# Patient Record
Sex: FEMALE | Race: WHITE | URBAN - METROPOLITAN AREA
[De-identification: names, ages, dates, MRNs, and addresses within clinical notes are randomized per-mention and may not be internally consistent; named-entity substitution may affect disease eponyms.]

---

## 2018-06-28 ENCOUNTER — APPOINTMENT (OUTPATIENT)
Dept: URBAN - METROPOLITAN AREA CLINIC 292 | Age: 48
Setting detail: DERMATOLOGY
End: 2018-06-28

## 2018-06-28 DIAGNOSIS — L82.0 INFLAMED SEBORRHEIC KERATOSIS: ICD-10-CM

## 2018-06-28 DIAGNOSIS — L82.1 OTHER SEBORRHEIC KERATOSIS: ICD-10-CM

## 2018-06-28 DIAGNOSIS — L81.0 POSTINFLAMMATORY HYPERPIGMENTATION: ICD-10-CM

## 2018-06-28 PROCEDURE — 17110 DESTRUCT B9 LESION 1-14: CPT

## 2018-06-28 PROCEDURE — OTHER COUNSELING: OTHER

## 2018-06-28 PROCEDURE — OTHER LIQUID NITROGEN: OTHER

## 2018-06-28 PROCEDURE — 99203 OFFICE O/P NEW LOW 30 MIN: CPT | Mod: 25

## 2018-06-28 ASSESSMENT — LOCATION DETAILED DESCRIPTION DERM: LOCATION DETAILED: INFERIOR THORACIC SPINE

## 2018-06-28 ASSESSMENT — LOCATION ZONE DERM: LOCATION ZONE: TRUNK

## 2018-06-28 ASSESSMENT — LOCATION SIMPLE DESCRIPTION DERM: LOCATION SIMPLE: UPPER BACK

## 2018-06-28 NOTE — PROCEDURE: LIQUID NITROGEN
Post-Care Instructions: 1.  After treatment with liquid nitrogen there may be some burning sensation or pain that can last up to 24 hours.  The discomfort can be relieved with extra strength Tylenol or similar pain medications taken as directed.  \\n2.  Within 24 to 48 hours a blister may form containing clear or bloody fluid.  This is expected.  If the blister becomes larger than a nickel you may arceo it with a sterile needle.\\n3.  Wast the area with soap and water twice daily.\\n4.  Apply a generous amount of Vaseline or Aquaphor ointment to the treated sites.\\n5.  Place a small dressing or Band-Aid over the wound.\\n\\nIf any problems occur, please contact our office at [phone] or go to the nearest Emergency Center. Post-Care Instructions: 1.  After treatment with liquid nitrogen there may be some burning sensation or pain that can last up to 24 hours.  The discomfort can be relieved with extra strength Tylenol or similar pain medications taken as directed.  \\n2.  Within 24 to 48 hours a blister may form containing clear or bloody fluid.  This is expected.  If the blister becomes larger than a nickel you may arceo it with a sterile needle.\\n3.  Wast the area with soap and water twice daily.\\n4.  Apply a generous amount of Vaseline or Aquaphor ointment to the treated sites.\\n5.  Place a small dressing or Band-Aid over the wound.\\n\\nIf any problems occur, please contact our office at [phone] or go to the nearest Emergency Center.

## 2019-11-18 ENCOUNTER — APPOINTMENT (OUTPATIENT)
Dept: URBAN - METROPOLITAN AREA CLINIC 292 | Age: 49
Setting detail: DERMATOLOGY
End: 2019-11-18

## 2019-11-18 DIAGNOSIS — L82.0 INFLAMED SEBORRHEIC KERATOSIS: ICD-10-CM

## 2019-11-18 DIAGNOSIS — L82.1 OTHER SEBORRHEIC KERATOSIS: ICD-10-CM

## 2019-11-18 DIAGNOSIS — L81.0 POSTINFLAMMATORY HYPERPIGMENTATION: ICD-10-CM

## 2019-11-18 PROCEDURE — 17110 DESTRUCT B9 LESION 1-14: CPT

## 2019-11-18 PROCEDURE — 99213 OFFICE O/P EST LOW 20 MIN: CPT | Mod: 25

## 2019-11-18 PROCEDURE — OTHER LIQUID NITROGEN: OTHER

## 2019-11-18 PROCEDURE — OTHER COUNSELING: OTHER

## 2019-11-18 ASSESSMENT — LOCATION DETAILED DESCRIPTION DERM: LOCATION DETAILED: LEFT INFERIOR LATERAL FOREHEAD

## 2019-11-18 ASSESSMENT — LOCATION SIMPLE DESCRIPTION DERM: LOCATION SIMPLE: LEFT FOREHEAD

## 2019-11-18 ASSESSMENT — LOCATION ZONE DERM: LOCATION ZONE: FACE

## 2019-11-18 NOTE — PROCEDURE: LIQUID NITROGEN
Post-Care Instructions: 1.  After treatment with liquid nitrogen there may be some burning sensation or pain that can last up to 24 hours.  The discomfort can be relieved with extra strength Tylenol or similar pain medications taken as directed.  \\n2.  Within 24 to 48 hours a blister may form containing clear or bloody fluid.  This is expected.  If the blister becomes larger than a nickel you may arceo it with a sterile needle.\\n3.  Wast the area with soap and water twice daily.\\n4.  Apply a generous amount of Vaseline or Aquaphor ointment to the treated sites.\\n5.  Place a small dressing or Band-Aid over the wound.\\n\\nIf any problems occur, please contact our office at [phone] or go to the nearest Emergency Center. Post-Care Instructions: 1.  After treatment with liquid nitrogen there may be some burning sensation or pain that can last up to 24 hours.  The discomfort can be relieved with extra strength Tylenol or similar pain medications taken as directed.  \\n2.  Within 24 to 48 hours a blister may form containing clear or bloody fluid.  This is expected.  If the blister becomes larger than a nickel you may arceo it with a sterile needle.\\n3.  Wast the area with soap and water twice daily.\\n4.  Apply a generous amount of Vaseline or Aquaphor ointment to the treated sites.\\n5.  Place a small dressing or Band-Aid over the wound.\\n\\nIf any problems occur, please contact our office at [phone] or go to the nearest Emergency Center.

## 2021-03-31 ENCOUNTER — APPOINTMENT (OUTPATIENT)
Dept: URBAN - METROPOLITAN AREA CLINIC 289 | Age: 51
Setting detail: DERMATOLOGY
End: 2021-04-03

## 2021-03-31 DIAGNOSIS — L82.1 OTHER SEBORRHEIC KERATOSIS: ICD-10-CM

## 2021-03-31 DIAGNOSIS — L85.3 XEROSIS CUTIS: ICD-10-CM

## 2021-03-31 DIAGNOSIS — L82.0 INFLAMED SEBORRHEIC KERATOSIS: ICD-10-CM

## 2021-03-31 DIAGNOSIS — L81.4 OTHER MELANIN HYPERPIGMENTATION: ICD-10-CM

## 2021-03-31 PROCEDURE — OTHER COUNSELING: OTHER

## 2021-03-31 PROCEDURE — OTHER LIQUID NITROGEN: OTHER

## 2021-03-31 PROCEDURE — 17110 DESTRUCT B9 LESION 1-14: CPT

## 2021-03-31 PROCEDURE — 99213 OFFICE O/P EST LOW 20 MIN: CPT | Mod: 25

## 2021-03-31 ASSESSMENT — LOCATION SIMPLE DESCRIPTION DERM
LOCATION SIMPLE: RIGHT THIGH
LOCATION SIMPLE: RIGHT THIGH

## 2021-03-31 ASSESSMENT — LOCATION DETAILED DESCRIPTION DERM
LOCATION DETAILED: RIGHT ANTERIOR PROXIMAL THIGH
LOCATION DETAILED: RIGHT ANTERIOR PROXIMAL THIGH

## 2021-03-31 ASSESSMENT — LOCATION ZONE DERM
LOCATION ZONE: LEG
LOCATION ZONE: LEG

## 2022-05-17 ENCOUNTER — APPOINTMENT (OUTPATIENT)
Dept: URBAN - METROPOLITAN AREA CLINIC 289 | Age: 52
Setting detail: DERMATOLOGY
End: 2022-05-20

## 2022-05-17 DIAGNOSIS — L81.4 OTHER MELANIN HYPERPIGMENTATION: ICD-10-CM

## 2022-05-17 DIAGNOSIS — L82.1 OTHER SEBORRHEIC KERATOSIS: ICD-10-CM

## 2022-05-17 PROCEDURE — OTHER COUNSELING: OTHER

## 2022-05-17 PROCEDURE — 99213 OFFICE O/P EST LOW 20 MIN: CPT

## 2022-07-11 ENCOUNTER — APPOINTMENT (OUTPATIENT)
Dept: URBAN - METROPOLITAN AREA CLINIC 289 | Age: 52
Setting detail: DERMATOLOGY
End: 2022-07-11

## 2022-07-11 DIAGNOSIS — L82.1 OTHER SEBORRHEIC KERATOSIS: ICD-10-CM

## 2022-07-11 DIAGNOSIS — L81.4 OTHER MELANIN HYPERPIGMENTATION: ICD-10-CM

## 2022-07-11 DIAGNOSIS — L82.0 INFLAMED SEBORRHEIC KERATOSIS: ICD-10-CM

## 2022-07-11 PROCEDURE — OTHER LIQUID NITROGEN: OTHER

## 2022-07-11 PROCEDURE — 17110 DESTRUCT B9 LESION 1-14: CPT

## 2022-07-11 PROCEDURE — 99213 OFFICE O/P EST LOW 20 MIN: CPT | Mod: 25

## 2022-07-11 PROCEDURE — OTHER COUNSELING: OTHER

## 2022-07-11 ASSESSMENT — LOCATION ZONE DERM: LOCATION ZONE: LEG

## 2022-07-11 ASSESSMENT — LOCATION SIMPLE DESCRIPTION DERM: LOCATION SIMPLE: RIGHT THIGH

## 2022-07-11 ASSESSMENT — LOCATION DETAILED DESCRIPTION DERM: LOCATION DETAILED: RIGHT ANTERIOR PROXIMAL THIGH

## 2022-07-11 NOTE — PROCEDURE: LIQUID NITROGEN
Show Spray Paint Technique Variable?: Yes
Spray Paint Text: The liquid nitrogen was applied to the skin utilizing a spray paint frosting technique.
Render Note In Bullet Format When Appropriate: No
Detail Level: Detailed
Post-Care Instructions: I reviewed with the patient in detail post-care instructions. Patient is to wear sunprotection, and avoid picking at any of the treated lesions. Pt may apply Vaseline to crusted or scabbing areas.
Medical Necessity Information: It is in your best interest to select a reason for this procedure from the list below. All of these items fulfill various CMS LCD requirements except the new and changing color options.
Medical Necessity Clause: This procedure was medically necessary because the lesions that were treated were:
Consent: The patient's consent was obtained including but not limited to risks of crusting, scabbing, blistering, scarring, darker or lighter pigmentary change, recurrence, incomplete removal and infection.

## 2023-05-11 ENCOUNTER — APPOINTMENT (OUTPATIENT)
Dept: URBAN - METROPOLITAN AREA CLINIC 233 | Age: 53
Setting detail: DERMATOLOGY
End: 2023-05-15

## 2023-05-11 DIAGNOSIS — L82.1 OTHER SEBORRHEIC KERATOSIS: ICD-10-CM

## 2023-05-11 DIAGNOSIS — L81.4 OTHER MELANIN HYPERPIGMENTATION: ICD-10-CM

## 2023-05-11 DIAGNOSIS — L29.8 OTHER PRURITUS: ICD-10-CM

## 2023-05-11 PROCEDURE — OTHER ORDER TESTS: OTHER

## 2023-05-11 PROCEDURE — OTHER PRESCRIPTION: OTHER

## 2023-05-11 PROCEDURE — OTHER MIPS QUALITY: OTHER

## 2023-05-11 PROCEDURE — OTHER COUNSELING: OTHER

## 2023-05-11 PROCEDURE — 99213 OFFICE O/P EST LOW 20 MIN: CPT

## 2023-05-11 RX ORDER — CAMPHOR AND MENTHOL 5; 5 MG/ML; MG/ML
LOTION TOPICAL
Qty: 222 | Refills: 3 | Status: ERX | COMMUNITY
Start: 2023-05-11

## 2023-05-11 RX ORDER — LORATADINE 10 MG/1
TABLET ORAL
Qty: 60 | Refills: 3 | Status: ERX | COMMUNITY
Start: 2023-05-11

## 2023-05-11 ASSESSMENT — LOCATION ZONE DERM: LOCATION ZONE: FACE

## 2023-05-11 ASSESSMENT — LOCATION DETAILED DESCRIPTION DERM: LOCATION DETAILED: LEFT SUPERIOR MEDIAL BUCCAL CHEEK

## 2023-05-11 ASSESSMENT — LOCATION SIMPLE DESCRIPTION DERM: LOCATION SIMPLE: LEFT CHEEK

## 2023-05-11 NOTE — PROCEDURE: ORDER TESTS
Expected Date Of Service: 05/11/2023
Performing Laboratory: [id number]
Bill For Surgical Tray: no
Billing Type: Third-Party Bill

## 2023-06-30 ENCOUNTER — APPOINTMENT (OUTPATIENT)
Dept: URBAN - METROPOLITAN AREA CLINIC 233 | Age: 53
Setting detail: DERMATOLOGY
End: 2023-06-30

## 2023-06-30 DIAGNOSIS — L81.4 OTHER MELANIN HYPERPIGMENTATION: ICD-10-CM

## 2023-06-30 DIAGNOSIS — L82.1 OTHER SEBORRHEIC KERATOSIS: ICD-10-CM

## 2023-06-30 DIAGNOSIS — L29.8 OTHER PRURITUS: ICD-10-CM

## 2023-06-30 PROCEDURE — 99213 OFFICE O/P EST LOW 20 MIN: CPT

## 2023-06-30 PROCEDURE — OTHER MIPS QUALITY: OTHER

## 2023-06-30 PROCEDURE — OTHER PRESCRIPTION: OTHER

## 2023-06-30 PROCEDURE — OTHER COUNSELING: OTHER

## 2023-06-30 PROCEDURE — OTHER TREATMENT REGIMEN: OTHER

## 2023-06-30 RX ORDER — LORATADINE 10 MG/1
TABLET ORAL
Qty: 60 | Refills: 3 | Status: ERX

## 2023-06-30 RX ORDER — LEVOCETIRIZINE DIHYDROCHLORIDE 5 MG/1
TABLET, COATED ORAL
Qty: 60 | Refills: 3 | Status: ERX | COMMUNITY
Start: 2023-06-30

## 2023-06-30 ASSESSMENT — LOCATION DETAILED DESCRIPTION DERM: LOCATION DETAILED: LEFT SUPERIOR MEDIAL BUCCAL CHEEK

## 2023-06-30 ASSESSMENT — LOCATION ZONE DERM: LOCATION ZONE: FACE

## 2023-06-30 ASSESSMENT — LOCATION SIMPLE DESCRIPTION DERM: LOCATION SIMPLE: LEFT CHEEK

## 2024-03-20 NOTE — PROCEDURE: COUNSELING
Problem: At Risk for Falls  Goal: Patient does not fall  3/19/2024 2152 by Alexandra Lyman RN  Outcome: Monitoring/Evaluating progress  3/19/2024 2152 by Alexandra Lyman RN  Outcome: Monitoring/Evaluating progress  Goal: Patient takes action to control fall-related risks  3/19/2024 2152 by Alexandra Lyman RN  Outcome: Monitoring/Evaluating progress  3/19/2024 2152 by Alexandra Lyman RN  Outcome: Monitoring/Evaluating progress     Problem: Pain  Goal: Acceptable pain level achieved/maintained at rest using appropriate pain scale for the patient  3/19/2024 2152 by Alexandra Lyman RN  Outcome: Monitoring/Evaluating progress  3/19/2024 2152 by Alexandra Lyman RN  Outcome: Monitoring/Evaluating progress  Goal: Acceptable pain level achieved/maintained with activity using appropriate pain scale for the patient  3/19/2024 2152 by Alexandra Lyman RN  Outcome: Monitoring/Evaluating progress  3/19/2024 2152 by Alexandra Lyman RN  Outcome: Monitoring/Evaluating progress  Goal: Acceptable pain level achieved/maintained without oversedation  3/19/2024 2152 by Alexandra Lyman RN  Outcome: Monitoring/Evaluating progress  3/19/2024 2152 by Alexandra Lyman RN  Outcome: Monitoring/Evaluating progress     Problem: Fluid Volume Excess  Goal: Fluid Volume Excess Symptoms Resolved  Description: Treatment often consists of oxygen and respiratory support with diuretic therapy at doses that exceed usual dose (typically doubled).  Monitor patient response to treatment.    Acute dyspnea should resolve quickly if dose is adequate and kidney function is adequate. Dyspnea/SOB should only be observed with Activity after effective treatment. Patient should be able to lie down comfortably, without SOB.  3/19/2024 2152 by Alexandra Lyman RN  Outcome: Monitoring/Evaluating progress  3/19/2024 2152 by Alexandra Lyman RN  Outcome: Monitoring/Evaluating progress  Goal: Oxygenation is maintained (SpO2 greater than or equal to 
90% or as ordered)  3/19/2024 2152 by Alexandra Lyman RN  Outcome: Monitoring/Evaluating progress  3/19/2024 2152 by Alexandra Lyman RN  Outcome: Monitoring/Evaluating progress     Problem: Skin Integrity Alteration  Goal: Skin remains intact with no new/deterioration of wound or pressure injury  3/19/2024 2152 by Alexandra Lyman RN  Outcome: Monitoring/Evaluating progress  3/19/2024 2152 by Alexandra Lyman RN  Outcome: Monitoring/Evaluating progress  Goal: Participates in wound care activities  3/19/2024 2152 by Alexandra Lyman RN  Outcome: Monitoring/Evaluating progress  3/19/2024 2152 by Alexandra Lyman RN  Outcome: Monitoring/Evaluating progress  Goal: Comfort optimized with pressure injury prevention strategies guided by patient/family preference. (Hospice)  3/19/2024 2152 by Alexandra Lyman RN  Outcome: Monitoring/Evaluating progress  3/19/2024 2152 by Alexandra Lyman RN  Outcome: Monitoring/Evaluating progress     
Detail Level: Detailed
monitored anesthesia care (MAC)